# Patient Record
Sex: FEMALE | Race: WHITE | NOT HISPANIC OR LATINO | Employment: OTHER | URBAN - METROPOLITAN AREA
[De-identification: names, ages, dates, MRNs, and addresses within clinical notes are randomized per-mention and may not be internally consistent; named-entity substitution may affect disease eponyms.]

---

## 2017-01-25 ENCOUNTER — OFFICE VISIT (OUTPATIENT)
Dept: FAMILY MEDICINE CLINIC | Facility: CLINIC | Age: 27
End: 2017-01-25

## 2017-01-25 VITALS
DIASTOLIC BLOOD PRESSURE: 80 MMHG | BODY MASS INDEX: 27.48 KG/M2 | RESPIRATION RATE: 14 BRPM | SYSTOLIC BLOOD PRESSURE: 113 MMHG | HEART RATE: 64 BPM | HEIGHT: 60 IN | WEIGHT: 140 LBS | TEMPERATURE: 98.1 F

## 2017-01-25 DIAGNOSIS — M79.7 FIBROMYALGIA: ICD-10-CM

## 2017-01-25 DIAGNOSIS — R53.82 CHRONIC FATIGUE: Primary | ICD-10-CM

## 2017-01-25 DIAGNOSIS — R11.0 CHRONIC NAUSEA: ICD-10-CM

## 2017-01-25 PROCEDURE — 99214 OFFICE O/P EST MOD 30 MIN: CPT | Performed by: FAMILY MEDICINE

## 2017-01-25 RX ORDER — DULOXETIN HYDROCHLORIDE 60 MG/1
60 CAPSULE, DELAYED RELEASE ORAL DAILY
Qty: 30 CAPSULE | Refills: 5 | Status: SHIPPED | OUTPATIENT
Start: 2017-01-25 | End: 2017-05-08

## 2017-01-25 RX ORDER — DULOXETIN HYDROCHLORIDE 30 MG/1
30 CAPSULE, DELAYED RELEASE ORAL DAILY
Qty: 7 CAPSULE | Refills: 0 | Status: SHIPPED | OUTPATIENT
Start: 2017-01-25 | End: 2017-05-08

## 2017-01-25 NOTE — PROGRESS NOTES
"Subjective   Deneen Conti is a 26 y.o. female.     CC: Fatigue         Blood in Vomit    History of Present Illness     Pt comes in today after a trip to [place] 2 weeks ago and got sick several hours after eating some chicken. She had multiple emesis and noted some reddish blood in one of the emesis. She had her GB out in August 2016 and since has suffered from nausea a lot of the days and occational diarrhea.  She also has had some increasing fatigue over the weeks, too. She sees Dr. Roe, psychiatrist, for PTSD and some chronic depression/anxiety and she reports some chronic, upper/lower body aches, poor sleep, waxing/waning sx, and slow worsening over time.    The following portions of the patient's history were reviewed and updated as appropriate: allergies, current medications, past family history, past medical history, past social history, past surgical history and problem list.    Review of Systems   Constitutional: Positive for fatigue. Negative for activity change, chills and fever.   Respiratory: Negative for cough and chest tightness.    Cardiovascular: Negative for chest pain and palpitations.   Gastrointestinal: Positive for nausea. Negative for abdominal pain, anal bleeding and blood in stool.        Blood in vomit   Endocrine: Negative for cold intolerance.   Musculoskeletal: Positive for myalgias.   Psychiatric/Behavioral: Negative for behavioral problems and dysphoric mood.     Visit Vitals   • /80   • Pulse 64   • Temp 98.1 °F (36.7 °C) (Oral)   • Resp 14   • Ht 60\" (152.4 cm)   • Wt 140 lb (63.5 kg)   • BMI 27.34 kg/m2       Objective   Physical Exam   Constitutional: She appears well-developed and well-nourished.   Neck: Neck supple. No thyromegaly present.   Cardiovascular: Normal rate and regular rhythm.    No murmur heard.  Pulmonary/Chest: Effort normal and breath sounds normal.   Abdominal: Bowel sounds are normal. There is no tenderness.   Musculoskeletal:   Multiple trigger " points upper/lower noted   Psychiatric: She has a normal mood and affect. Her behavior is normal.   Nursing note and vitals reviewed.  Rosalva BAGLEY present for exam.    Assessment/Plan   Deneen was seen today for some blood in vomit and fatigue.    Diagnoses and all orders for this visit:    Chronic fatigue  -     TSH  -     T4, Free  -     Comprehensive Metabolic Panel  -     CBC & Differential    Fibromyalgia  -     DULoxetine (CYMBALTA) 30 MG capsule; Take 1 capsule by mouth Daily.  -     DULoxetine (CYMBALTA) 60 MG capsule; Take 1 capsule by mouth Daily.    Chronic nausea  -     Ambulatory Referral to Gastroenterology

## 2017-01-25 NOTE — MR AVS SNAPSHOT
Deneen Conti   1/25/2017 1:30 PM   Office Visit    Provider:  Yusuf Cameron MD   Department:  [facility]   Dept Phone:  [phone]                Your Full Care Plan              Today's Medication Changes          These changes are accurate as of: 1/25/17  2:16 PM.  If you have any questions, ask your nurse or doctor.               New Medication(s)Ordered:     * DULoxetine 30 MG capsule   Commonly known as:  CYMBALTA   Take 1 capsule by mouth Daily.   Started by:  Yusuf Cameron MD       * DULoxetine 60 MG capsule   Commonly known as:  CYMBALTA   Take 1 capsule by mouth Daily.   Started by:  Yusuf Cameron MD       * Notice:  This list has 2 medication(s) that are the same as other medications prescribed for you. Read the directions carefully, and ask your doctor or other care provider to review them with you.      Stop taking medication(s)listed here:     GILDESS FE 1/20 1-20 MG-MCG per tablet   Generic drug:  norethindrone-ethinyl estradiol   Stopped by:  Yusuf Cameron MD           MethylPREDNISolone 4 MG tablet   Commonly known as:  MEDROL   Stopped by:  Yusuf Cameron MD                Where to Get Your Medications      These medications were sent to [facility] [id number] [facility] - [address] - [phone]  - [phone] [address] [address]     Phone:  [phone]     DULoxetine 30 MG capsule    DULoxetine 60 MG capsule                  Your Updated Medication List          This list is accurate as of: 1/25/17  2:16 PM.  Always use your most recent med list.                * DULoxetine 30 MG capsule   Commonly known as:  CYMBALTA   Take 1 capsule by mouth Daily.       * DULoxetine 60 MG capsule   Commonly known as:  CYMBALTA   Take 1 capsule by mouth Daily.       * Notice:  This list has 2 medication(s) that are the same as other medications prescribed for you. Read the directions carefully, and ask your  "doctor or other care provider to review them with you.            We Performed the Following     Ambulatory Referral to Gastroenterology     CBC & Differential     Comprehensive Metabolic Panel     T4, Free     TSH       You Were Diagnosed With        Codes Comments    Chronic fatigue    -  Primary ICD-10-CM: R53.82  ICD-9-CM: 780.79     Fibromyalgia     ICD-10-CM: M79.7  ICD-9-CM: 729.1     Chronic nausea     ICD-10-CM: R11.0  ICD-9-CM: 787.02       Instructions     None    Patient Instructions History      [employer] Signup     [facility] [employer] allows you to send messages to your doctor, view your test results, renew your prescriptions, schedule appointments, and more. To sign up, go to [employer] and click on the Sign Up Now link in the New User? box. Enter your [employer] Activation Code exactly as it appears below along with the last four digits of your Social Security Number and your Date of Birth () to complete the sign-up process. If you do not sign up before the expiration date, you must request a new code.    [employer] Activation Code: [email or web address]  Expires: 2017  2:16 PM    If you have questions, you can email [email or web address]) or call [phone] to talk to our [employer] staff. Remember, [employer] is NOT to be used for urgent needs. For medical emergencies, dial 911.               Other Info from Your Visit           Allergies     Erythromycin      Gardasil [Hpv Vaccine Recombinant (Yeast Derived)]      Penicillins        Reason for Visit     some blood in vomit gallbladder removed 2016    Fatigue           Vital Signs     Blood Pressure Pulse Temperature Respirations Height Weight    113/80 64 98.1 °F (36.7 °C) (Oral) 14 60\" (152.4 cm) 140 lb (63.5 kg)    Body Mass Index Smoking Status                27.34 kg/m2 Never Smoker          Problems and Diagnoses Noted     Gall bladder inflammation    Fibromyalgia    Chronic fatigue    -  Primary    Chronic nausea          "

## 2017-01-26 LAB
ALBUMIN SERPL-MCNC: 4.4 G/DL (ref 3.5–5.2)
ALBUMIN/GLOB SERPL: 1.6 G/DL
ALP SERPL-CCNC: 97 U/L (ref 39–117)
ALT SERPL-CCNC: 16 U/L (ref 1–33)
AST SERPL-CCNC: 21 U/L (ref 1–32)
BASOPHILS # BLD AUTO: 0.03 10*3/MM3 (ref 0–0.2)
BASOPHILS NFR BLD AUTO: 0.3 % (ref 0–1.5)
BILIRUB SERPL-MCNC: 1.1 MG/DL (ref 0.1–1.2)
BUN SERPL-MCNC: 17 MG/DL (ref 6–20)
BUN/CREAT SERPL: 16 (ref 7–25)
CALCIUM SERPL-MCNC: 10.1 MG/DL (ref 8.6–10.5)
CHLORIDE SERPL-SCNC: 100 MMOL/L (ref 98–107)
CO2 SERPL-SCNC: 25.1 MMOL/L (ref 22–29)
CREAT SERPL-MCNC: 1.06 MG/DL (ref 0.57–1)
EOSINOPHIL # BLD AUTO: 0.11 10*3/MM3 (ref 0–0.7)
EOSINOPHIL NFR BLD AUTO: 1.1 % (ref 0.3–6.2)
ERYTHROCYTE [DISTWIDTH] IN BLOOD BY AUTOMATED COUNT: 12.7 % (ref 11.7–13)
GLOBULIN SER CALC-MCNC: 2.8 GM/DL
GLUCOSE SERPL-MCNC: 78 MG/DL (ref 65–99)
HCT VFR BLD AUTO: 44.3 % (ref 35.6–45.5)
HGB BLD-MCNC: 14.9 G/DL (ref 11.9–15.5)
IMM GRANULOCYTES # BLD: 0.02 10*3/MM3 (ref 0–0.03)
IMM GRANULOCYTES NFR BLD: 0.2 % (ref 0–0.5)
LYMPHOCYTES # BLD AUTO: 2.33 10*3/MM3 (ref 0.9–4.8)
LYMPHOCYTES NFR BLD AUTO: 23 % (ref 19.6–45.3)
MCH RBC QN AUTO: 29.4 PG (ref 26.9–32)
MCHC RBC AUTO-ENTMCNC: 33.6 G/DL (ref 32.4–36.3)
MCV RBC AUTO: 87.5 FL (ref 80.5–98.2)
MONOCYTES # BLD AUTO: 0.73 10*3/MM3 (ref 0.2–1.2)
MONOCYTES NFR BLD AUTO: 7.2 % (ref 5–12)
NEUTROPHILS # BLD AUTO: 6.89 10*3/MM3 (ref 1.9–8.1)
NEUTROPHILS NFR BLD AUTO: 68.2 % (ref 42.7–76)
PLATELET # BLD AUTO: 274 10*3/MM3 (ref 140–500)
POTASSIUM SERPL-SCNC: 4.4 MMOL/L (ref 3.5–5.2)
PROT SERPL-MCNC: 7.2 G/DL (ref 6–8.5)
RBC # BLD AUTO: 5.06 10*6/MM3 (ref 3.9–5.2)
SODIUM SERPL-SCNC: 139 MMOL/L (ref 136–145)
T4 FREE SERPL-MCNC: 1.36 NG/DL (ref 0.93–1.7)
TSH SERPL DL<=0.005 MIU/L-ACNC: 1.36 MIU/ML (ref 0.27–4.2)
WBC # BLD AUTO: 10.11 10*3/MM3 (ref 4.5–10.7)

## 2017-02-14 ENCOUNTER — OFFICE VISIT (OUTPATIENT)
Dept: GASTROENTEROLOGY | Facility: CLINIC | Age: 27
End: 2017-02-14

## 2017-02-14 VITALS
SYSTOLIC BLOOD PRESSURE: 128 MMHG | HEIGHT: 60 IN | BODY MASS INDEX: 27.68 KG/M2 | DIASTOLIC BLOOD PRESSURE: 76 MMHG | WEIGHT: 141 LBS

## 2017-02-14 DIAGNOSIS — R11.0 NAUSEA: Primary | ICD-10-CM

## 2017-02-14 PROCEDURE — 99203 OFFICE O/P NEW LOW 30 MIN: CPT | Performed by: INTERNAL MEDICINE

## 2017-02-14 RX ORDER — PANTOPRAZOLE SODIUM 40 MG/1
40 TABLET, DELAYED RELEASE ORAL DAILY
Qty: 30 TABLET | Refills: 1 | Status: SHIPPED | OUTPATIENT
Start: 2017-02-14 | End: 2017-05-08

## 2017-02-14 NOTE — PATIENT INSTRUCTIONS
Labs today    Start pantoprazole    Consider EGD in 1 month if no better-- pls call the office    For any additional questions, concerns or changes to your condition after today's office visit please contact the office at [phone].

## 2017-02-14 NOTE — PROGRESS NOTES
Chief Complaint   Patient presents with   • Nausea       Subjective     HPI    Deneen Conti is a 26 y.o. female with a past medical history noted below who presents for evaluation of nausea.  Symptoms present since high school-- thinks it was a reaction following her getting the HPV vaccine.   She had a month long spell of severe nausea and anorexia that gradually resolved.  Now with symptoms that are intermittent, anywhere from 3-5 times per week.  Depends on what she eats.  Worse with greasy, fried, and rich foods.  Ok with bland and low fat foods.  Symptoms will last all day.  Occasional vomiting of stomach contents.  Does not take any occasions for it.    She has been on a vegetarian diet.  Must be careful with her diet.  Weight has been stable for a couple of years.  BMs normal.  No heartburn or reflux.    Cholecystectomy 8/2016 due to low EF.  No change in symptoms of nausea since then.    No smoking, rare ETOH.  Works [employer].    No family history of GI malignancy.  Rare NSAIDs.      Past Medical History   Diagnosis Date   • Fatigue          Current Outpatient Prescriptions:   •  DULoxetine (CYMBALTA) 30 MG capsule, Take 1 capsule by mouth Daily., Disp: 7 capsule, Rfl: 0  •  DULoxetine (CYMBALTA) 60 MG capsule, Take 1 capsule by mouth Daily., Disp: 30 capsule, Rfl: 5  •  pantoprazole (PROTONIX) 40 MG EC tablet, Take 1 tablet by mouth Daily., Disp: 30 tablet, Rfl: 1    Allergies   Allergen Reactions   • Amoxicillin    • Erythromycin    • Gardasil [Hpv Vaccine Recombinant (Yeast Derived)]    • Penicillins        Social History     Social History   • Marital status:      Spouse name: N/A   • Number of children: N/A   • Years of education: N/A     Occupational History   • Not on file.     Social History Main Topics   • Smoking status: Never Smoker   • Smokeless tobacco: Not on file   • Alcohol use No   • Drug use: No   • Sexual activity: Not on file     Other Topics Concern   • Not on  file     Social History Narrative       Family History   Problem Relation Age of Onset   • Bone cancer Other    • Diabetes Other        Review of Systems   Constitutional: Negative for activity change, appetite change and fatigue.   HENT: Negative for congestion, sore throat and trouble swallowing.    Respiratory: Negative.    Cardiovascular: Negative.    Gastrointestinal: Positive for nausea. Negative for abdominal distention, abdominal pain and blood in stool.   Endocrine: Negative for cold intolerance and heat intolerance.   Genitourinary: Negative for difficulty urinating, dysuria and frequency.   Musculoskeletal: Negative for arthralgias, back pain and myalgias.   Skin: Negative.    Hematological: Negative for adenopathy. Does not bruise/bleed easily.   All other systems reviewed and are negative.      Objective     Vitals:    02/14/17 1430   BP: 128/76     Last 2 weights    02/14/17  1430   Weight: 141 lb (64 kg)     Body mass index is 27.54 kg/(m^2).    Physical Exam   Constitutional: She is oriented to person, place, and time. She appears well-developed and well-nourished. No distress.   HENT:   Head: Normocephalic and atraumatic.   Right Ear: External ear normal.   Left Ear: External ear normal.   Nose: Nose normal.   Mouth/Throat: Oropharynx is clear and moist.   Eyes: Conjunctivae and EOM are normal. Right eye exhibits no discharge. Left eye exhibits no discharge. No scleral icterus.   Neck: Normal range of motion. Neck supple. No thyromegaly present.   No supraclavicular adenopathy   Cardiovascular: Normal rate, regular rhythm, normal heart sounds and intact distal pulses.  Exam reveals no gallop.    No murmur heard.  No lower extremity edema   Pulmonary/Chest: Effort normal and breath sounds normal. No respiratory distress. She has no wheezes.   Abdominal: Soft. Normal appearance and bowel sounds are normal. She exhibits no distension and no mass. There is no hepatosplenomegaly. There is no tenderness.  There is no rigidity, no rebound and no guarding.   Genitourinary:   Genitourinary Comments: Rectal exam deferred   Musculoskeletal: Normal range of motion. She exhibits no edema or tenderness.   No atrophy of upper or lower extremities.  Normal digits and nails of both hands.   Lymphadenopathy:     She has no cervical adenopathy.   Neurological: She is alert and oriented to person, place, and time. She displays no atrophy. Coordination normal.   Skin: Skin is warm and dry. No rash noted. She is not diaphoretic. No erythema.   Psychiatric: She has a normal mood and affect. Her behavior is normal. Judgment and thought content normal.   Vitals reviewed.      WBC   Date Value Ref Range Status   01/25/2017 10.11 4.50 - 10.70 10*3/mm3 Final     RBC   Date Value Ref Range Status   01/25/2017 5.06 3.90 - 5.20 10*6/mm3 Final     HEMOGLOBIN   Date Value Ref Range Status   01/25/2017 14.9 11.9 - 15.5 g/dL Final     HEMATOCRIT   Date Value Ref Range Status   01/25/2017 44.3 35.6 - 45.5 % Final     MCV   Date Value Ref Range Status   01/25/2017 87.5 80.5 - 98.2 fL Final     MCH   Date Value Ref Range Status   01/25/2017 29.4 26.9 - 32.0 pg Final     MCHC   Date Value Ref Range Status   01/25/2017 33.6 32.4 - 36.3 g/dL Final     RDW   Date Value Ref Range Status   01/25/2017 12.7 11.7 - 13.0 % Final     PLATELETS   Date Value Ref Range Status   01/25/2017 274 140 - 500 10*3/mm3 Final     NEUTROPHIL REL %   Date Value Ref Range Status   01/25/2017 68.2 42.7 - 76.0 % Final     LYMPHOCYTE REL %   Date Value Ref Range Status   01/25/2017 23.0 19.6 - 45.3 % Final     MONOCYTE REL %   Date Value Ref Range Status   01/25/2017 7.2 5.0 - 12.0 % Final     EOSINOPHIL REL %   Date Value Ref Range Status   01/25/2017 1.1 0.3 - 6.2 % Final     BASOPHIL REL %   Date Value Ref Range Status   01/25/2017 0.3 0.0 - 1.5 % Final     NEUTROPHILS ABSOLUTE   Date Value Ref Range Status   01/25/2017 6.89 1.90 - 8.10 10*3/mm3 Final     LYMPHOCYTES  ABSOLUTE   Date Value Ref Range Status   01/25/2017 2.33 0.90 - 4.80 10*3/mm3 Final     MONOCYTES ABSOLUTE   Date Value Ref Range Status   01/25/2017 0.73 0.20 - 1.20 10*3/mm3 Final     EOSINOPHILS ABSOLUTE   Date Value Ref Range Status   01/25/2017 0.11 0.00 - 0.70 10*3/mm3 Final     BASOPHILS ABSOLUTE   Date Value Ref Range Status   01/25/2017 0.03 0.00 - 0.20 10*3/mm3 Final       SODIUM   Date Value Ref Range Status   01/25/2017 139 136 - 145 mmol/L Final     POTASSIUM   Date Value Ref Range Status   01/25/2017 4.4 3.5 - 5.2 mmol/L Final     TOTAL CO2   Date Value Ref Range Status   01/25/2017 25.1 22.0 - 29.0 mmol/L Final     CHLORIDE   Date Value Ref Range Status   01/25/2017 100 98 - 107 mmol/L Final     CREATININE   Date Value Ref Range Status   01/25/2017 1.06 (H) 0.57 - 1.00 mg/dL Final     BUN   Date Value Ref Range Status   01/25/2017 17 6 - 20 mg/dL Final     BUN/CREATININE RATIO   Date Value Ref Range Status   01/25/2017 16.0 7.0 - 25.0 Final     CALCIUM   Date Value Ref Range Status   01/25/2017 10.1 8.6 - 10.5 mg/dL Final     EGFR NON  AM   Date Value Ref Range Status   01/25/2017 63 >60 mL/min/1.73 Final     ALKALINE PHOSPHATASE   Date Value Ref Range Status   01/25/2017 97 39 - 117 U/L Final     ALT (SGPT)   Date Value Ref Range Status   01/25/2017 16 1 - 33 U/L Final     AST (SGOT)   Date Value Ref Range Status   01/25/2017 21 1 - 32 U/L Final     TOTAL BILIRUBIN   Date Value Ref Range Status   01/25/2017 1.1 0.1 - 1.2 mg/dL Final     ALBUMIN   Date Value Ref Range Status   01/25/2017 4.40 3.50 - 5.20 g/dL Final     A/G RATIO   Date Value Ref Range Status   01/25/2017 1.6 g/dL Final         Imaging Results (last 7 days)     ** No results found for the last 168 hours. **            No notes on file    Assessment/Plan    Nausea: Chronic problem for her.  Persistent despite having her gallbladder out.  Worse with fatty foods.  No alarm features such as weight loss, abnormal labs, fatigue  etc.  -Trial of daily pantoprazole  -Check H. pylori antibodies  -If no improvement with above then consider EGD and/or gastric emptying study    Deneen was seen today for nausea.    Diagnoses and all orders for this visit:    Nausea  -     pantoprazole (PROTONIX) 40 MG EC tablet; Take 1 tablet by mouth Daily.  -     H. Pylori Antibody, IgA  -     H. Pylori Antibody, IgG      I have discussed the above plan with the patient.  They verbalize understanding and are in agreement with the plan.  They have been advised to contact the office for any questions, concerns, or changes related to their health.    EMR Dragon/Transcription disclaimer:       Much of this encounter note is an electronic transcription/translation of spoken language to printed text. The electronic translation of spoken language may permit erroneous, or at times, nonsensical words or phrases to be inadvertently transcribed; Although I have reviewed the note for such errors, some may still exist.

## 2017-02-15 ENCOUNTER — TELEPHONE (OUTPATIENT)
Dept: FAMILY MEDICINE CLINIC | Facility: CLINIC | Age: 27
End: 2017-02-15

## 2017-02-17 LAB
H PYLORI IGA SER-ACNC: <9 UNITS (ref 0–8.9)
H PYLORI IGG SER IA-ACNC: <0.9 U/ML (ref 0–0.8)

## 2017-02-21 ENCOUNTER — TELEPHONE (OUTPATIENT)
Dept: GASTROENTEROLOGY | Facility: CLINIC | Age: 27
End: 2017-02-21

## 2017-02-21 NOTE — TELEPHONE ENCOUNTER
Call to pt.  Advise per Dr Rodgers that her H pylori testing was negative.  Pt verb understanding.

## 2017-02-21 NOTE — TELEPHONE ENCOUNTER
----- Message from Janice Rodgers MD sent at 2/20/2017  8:26 PM EST -----  Her H pylori testing was negative

## 2017-04-21 ENCOUNTER — OFFICE VISIT (OUTPATIENT)
Dept: GASTROENTEROLOGY | Facility: CLINIC | Age: 27
End: 2017-04-21

## 2017-04-21 VITALS
BODY MASS INDEX: 26.46 KG/M2 | DIASTOLIC BLOOD PRESSURE: 72 MMHG | HEIGHT: 60 IN | WEIGHT: 134.8 LBS | SYSTOLIC BLOOD PRESSURE: 122 MMHG

## 2017-04-21 DIAGNOSIS — R10.84 GENERALIZED ABDOMINAL PAIN: ICD-10-CM

## 2017-04-21 DIAGNOSIS — R11.0 NAUSEA: Primary | ICD-10-CM

## 2017-04-21 DIAGNOSIS — R63.4 WEIGHT LOSS: ICD-10-CM

## 2017-04-21 PROCEDURE — 99214 OFFICE O/P EST MOD 30 MIN: CPT | Performed by: INTERNAL MEDICINE

## 2017-04-21 RX ORDER — ONDANSETRON 4 MG/1
4 TABLET, ORALLY DISINTEGRATING ORAL EVERY 8 HOURS PRN
Qty: 15 TABLET | Refills: 0 | Status: SHIPPED | OUTPATIENT
Start: 2017-04-21 | End: 2017-06-26 | Stop reason: SDUPTHER

## 2017-04-21 RX ORDER — SODIUM CHLORIDE, SODIUM LACTATE, POTASSIUM CHLORIDE, CALCIUM CHLORIDE 600; 310; 30; 20 MG/100ML; MG/100ML; MG/100ML; MG/100ML
30 INJECTION, SOLUTION INTRAVENOUS CONTINUOUS
Status: CANCELLED | OUTPATIENT
Start: 2017-04-21

## 2017-04-21 RX ORDER — SODIUM CHLORIDE 0.9 % (FLUSH) 0.9 %
1-10 SYRINGE (ML) INJECTION AS NEEDED
Status: CANCELLED | OUTPATIENT
Start: 2017-04-21

## 2017-04-21 NOTE — PROGRESS NOTES
Chief Complaint   Patient presents with   • Nausea     Subjective     HPI  Deneen Conti is a 26 y.o. female who presents for follow up nausea.  Initially seen in February for symptoms.  It has been a chronic issue for her.  It precipitated her getting her gallbladder removed in August 2016.  This did not change her symptoms.  Persists with nausea, no vomiting, especially if she varies from her diet of nuts, fruit, vegetables, yogurt and water.  She does have some occasional diarrhea.  She reports may be 1-2 episodes every couple of weeks.  It is limited and not lasting all day.  Sometimes she will have some sharp abdominal pain related to this but not severe.  At her last visit we checked H. pylori antibodies which were negative.  Additionally she completed a trial of pantoprazole.  She reports no change in her symptoms.  She reports that today she is feeling nauseated and does not want to eat anything.  She has only had yogurt, fruits and granola today.  She has lost about 7 pounds since her last visit 2 months ago.  He feels that the weight loss is due to her restricted diet.  She has been able to eat certain foods that she is prepared in the evening, and in the next day she'll eat it and did not feel well.  Rare episodes of vomiting.    Past Medical History:   Diagnosis Date   • Fatigue        Social History     Social History   • Marital status:      Spouse name: N/A   • Number of children: N/A   • Years of education: N/A     Social History Main Topics   • Smoking status: Never Smoker   • Smokeless tobacco: None   • Alcohol use No   • Drug use: No   • Sexual activity: Not Asked     Other Topics Concern   • None     Social History Narrative         Current Outpatient Prescriptions:   •  DULoxetine (CYMBALTA) 30 MG capsule, Take 1 capsule by mouth Daily., Disp: 7 capsule, Rfl: 0  •  DULoxetine (CYMBALTA) 60 MG capsule, Take 1 capsule by mouth Daily., Disp: 30 capsule, Rfl: 5  •  ondansetron ODT (ZOFRAN  ODT) 4 MG disintegrating tablet, Take 1 tablet by mouth Every 8 (Eight) Hours As Needed for Nausea or Vomiting., Disp: 15 tablet, Rfl: 0  •  pantoprazole (PROTONIX) 40 MG EC tablet, Take 1 tablet by mouth Daily., Disp: 30 tablet, Rfl: 1    Review of Systems   Constitutional: Negative for activity change, appetite change, fatigue and unexpected weight change.   HENT: Negative for sore throat and trouble swallowing.    Respiratory: Negative.    Cardiovascular: Negative.    Gastrointestinal: Positive for nausea. Negative for abdominal distention, abdominal pain and blood in stool.   Endocrine: Negative for cold intolerance and heat intolerance.   Genitourinary: Negative for difficulty urinating, dysuria and frequency.   Musculoskeletal: Negative for arthralgias, back pain and myalgias.   Skin: Negative.    Hematological: Negative for adenopathy. Does not bruise/bleed easily.   All other systems reviewed and are negative.      Objective   Vitals:    04/21/17 1514   BP: 122/72     Last Weight    04/21/17  1514   Weight: 134 lb 12.8 oz (61.1 kg)     Body mass index is 26.33 kg/(m^2).      Physical Exam   Constitutional: She is oriented to person, place, and time. She appears well-developed and well-nourished. No distress.   HENT:   Head: Normocephalic and atraumatic.   Right Ear: External ear normal.   Left Ear: External ear normal.   Nose: Nose normal.   Eyes: Conjunctivae and EOM are normal. No scleral icterus.   Cardiovascular: Normal rate, regular rhythm, normal heart sounds and intact distal pulses.  Exam reveals no gallop.    No murmur heard.  No lower extremity edema   Pulmonary/Chest: Effort normal and breath sounds normal. No respiratory distress. She has no wheezes.   Abdominal: Soft. Normal appearance and bowel sounds are normal. She exhibits no distension and no mass. There is no hepatosplenomegaly. There is tenderness. There is no rigidity, no rebound and no guarding. No hernia.   Healed laparoscopic  cholecystectomy scars.  Mild tenderness to palpation in her bilateral lower quadrants   Genitourinary:   Genitourinary Comments: Rectal exam deferred   Musculoskeletal: Normal range of motion. She exhibits no edema or tenderness.   Normal digits and nails of both hands.  No atrophy or wasting of upper or lower extremeties   Neurological: She is alert and oriented to person, place, and time. She displays no atrophy. Coordination normal.   Skin: Skin is warm and dry. No rash noted. She is not diaphoretic. No erythema.   Psychiatric: She has a normal mood and affect. Her behavior is normal. Judgment and thought content normal.   Vitals reviewed.      WBC   Date Value Ref Range Status   01/25/2017 10.11 4.50 - 10.70 10*3/mm3 Final     RBC   Date Value Ref Range Status   01/25/2017 5.06 3.90 - 5.20 10*6/mm3 Final     Hemoglobin   Date Value Ref Range Status   01/25/2017 14.9 11.9 - 15.5 g/dL Final     Hematocrit   Date Value Ref Range Status   01/25/2017 44.3 35.6 - 45.5 % Final     MCV   Date Value Ref Range Status   01/25/2017 87.5 80.5 - 98.2 fL Final     MCH   Date Value Ref Range Status   01/25/2017 29.4 26.9 - 32.0 pg Final     MCHC   Date Value Ref Range Status   01/25/2017 33.6 32.4 - 36.3 g/dL Final     RDW   Date Value Ref Range Status   01/25/2017 12.7 11.7 - 13.0 % Final     Platelets   Date Value Ref Range Status   01/25/2017 274 140 - 500 10*3/mm3 Final     Neutrophil Rel %   Date Value Ref Range Status   01/25/2017 68.2 42.7 - 76.0 % Final     Lymphocyte Rel %   Date Value Ref Range Status   01/25/2017 23.0 19.6 - 45.3 % Final     Monocyte Rel %   Date Value Ref Range Status   01/25/2017 7.2 5.0 - 12.0 % Final     Eosinophil Rel %   Date Value Ref Range Status   01/25/2017 1.1 0.3 - 6.2 % Final     Basophil Rel %   Date Value Ref Range Status   01/25/2017 0.3 0.0 - 1.5 % Final     Neutrophils Absolute   Date Value Ref Range Status   01/25/2017 6.89 1.90 - 8.10 10*3/mm3 Final     Lymphocytes Absolute   Date  Value Ref Range Status   01/25/2017 2.33 0.90 - 4.80 10*3/mm3 Final     Monocytes Absolute   Date Value Ref Range Status   01/25/2017 0.73 0.20 - 1.20 10*3/mm3 Final     Eosinophils Absolute   Date Value Ref Range Status   01/25/2017 0.11 0.00 - 0.70 10*3/mm3 Final     Basophils Absolute   Date Value Ref Range Status   01/25/2017 0.03 0.00 - 0.20 10*3/mm3 Final       Lab Results   Component Value Date    BUN 17 01/25/2017    CREATININE 1.06 (H) 01/25/2017    EGFRIFNONA 63 01/25/2017    EGFRIFAFRI 77 01/25/2017    BCR 16.0 01/25/2017    CO2 25.1 01/25/2017    CALCIUM 10.1 01/25/2017    PROTENTOTREF 7.2 01/25/2017    ALBUMIN 4.40 01/25/2017    LABIL2 1.6 01/25/2017    AST 21 01/25/2017    ALT 16 01/25/2017         Imaging Results (last 7 days)     ** No results found for the last 168 hours. **            Assessment/Plan    1.  Nausea: Worsening.  Chronic and persistent problem.  It is causing severe food editing and weight loss.  Negative H. pylori antibodies.  No response to PPI.  Differential of gastritis versus gastroparesis versus functional dyspepsia  2.  Weight loss: New issue, secondary to symptoms of above, weight had been stable at her prior visit  3.  Abdominal pain: Stable.  Generalized location, tender to palpation mildly in her bilateral lower quadrants    Plan:  I think at this point, EGD is merited for further evaluation to rule out any gastritis or obstruction.  Discussed the procedure, the risks and she verbalizes understanding and agrees to proceed  If the above is normal, then well proceed with gastric emptying study  She is to use ginger candy and ginger ale as needed to try to manage mild symptoms of nausea  Zofran only for severe episodes of nausea    Deneen was seen today for nausea.    Diagnoses and all orders for this visit:    Nausea  -     Case Request; Standing  -     Implement Anesthesia Orders Day of Procedure; Standing  -     Obtain Informed Consent; Standing  -     Verify Informed  Consent; Standing  -     Insert Peripheral IV; Standing  -     Saline Lock & Maintain IV Access; Standing  -     sodium chloride 0.9 % flush 1-10 mL; Infuse 1-10 mL into a venous catheter As Needed for Line Care.  -     lactated ringers infusion; Infuse 30 mL/hr into a venous catheter Continuous.  -     Case Request  -     ondansetron ODT (ZOFRAN ODT) 4 MG disintegrating tablet; Take 1 tablet by mouth Every 8 (Eight) Hours As Needed for Nausea or Vomiting.    Weight loss  -     Case Request; Standing  -     Implement Anesthesia Orders Day of Procedure; Standing  -     Obtain Informed Consent; Standing  -     Verify Informed Consent; Standing  -     Insert Peripheral IV; Standing  -     Saline Lock & Maintain IV Access; Standing  -     sodium chloride 0.9 % flush 1-10 mL; Infuse 1-10 mL into a venous catheter As Needed for Line Care.  -     lactated ringers infusion; Infuse 30 mL/hr into a venous catheter Continuous.  -     Case Request    Generalized abdominal pain  -     Case Request; Standing  -     Implement Anesthesia Orders Day of Procedure; Standing  -     Obtain Informed Consent; Standing  -     Verify Informed Consent; Standing  -     Insert Peripheral IV; Standing  -     Saline Lock & Maintain IV Access; Standing  -     sodium chloride 0.9 % flush 1-10 mL; Infuse 1-10 mL into a venous catheter As Needed for Line Care.  -     lactated ringers infusion; Infuse 30 mL/hr into a venous catheter Continuous.  -     Case Request        EMR Dragon/Transcription disclaimer:       Much of this encounter note is an electronic transcription/translation of spoken language to printed text. The electronic translation of spoken language may permit erroneous, or at times, nonsensical words or phrases to be inadvertently transcribed; Although I have reviewed the note for such errors, some may still exist.

## 2017-04-21 NOTE — PATIENT INSTRUCTIONS
Schedule the EGD     Use ginger ale, ginger candy to help with nausea    Use the zofran for severe episodes of nausea    For any additional questions, concerns or changes to your condition after today's office visit please contact the office at [phone].

## 2017-05-09 ENCOUNTER — HOSPITAL ENCOUNTER (OUTPATIENT)
Facility: HOSPITAL | Age: 27
Setting detail: HOSPITAL OUTPATIENT SURGERY
Discharge: HOME | End: 2017-05-09
Attending: INTERNAL MEDICINE | Admitting: INTERNAL MEDICINE

## 2017-05-09 ENCOUNTER — ANESTHESIA EVENT (OUTPATIENT)
Dept: GASTROENTEROLOGY | Facility: HOSPITAL | Age: 27
End: 2017-05-09

## 2017-05-09 ENCOUNTER — ANESTHESIA (OUTPATIENT)
Dept: GASTROENTEROLOGY | Facility: HOSPITAL | Age: 27
End: 2017-05-09

## 2017-05-09 VITALS
DIASTOLIC BLOOD PRESSURE: 74 MMHG | WEIGHT: 131.8 LBS | RESPIRATION RATE: 14 BRPM | HEART RATE: 67 BPM | SYSTOLIC BLOOD PRESSURE: 110 MMHG | HEIGHT: 60 IN | BODY MASS INDEX: 25.87 KG/M2 | TEMPERATURE: 98 F | OXYGEN SATURATION: 100 %

## 2017-05-09 DIAGNOSIS — R11.0 NAUSEA: ICD-10-CM

## 2017-05-09 DIAGNOSIS — R10.84 GENERALIZED ABDOMINAL PAIN: ICD-10-CM

## 2017-05-09 DIAGNOSIS — R63.4 WEIGHT LOSS: ICD-10-CM

## 2017-05-09 LAB
B-HCG UR QL: NEGATIVE
INTERNAL NEGATIVE CONTROL: NEGATIVE
INTERNAL POSITIVE CONTROL: POSITIVE
Lab: NORMAL

## 2017-05-09 PROCEDURE — 43239 EGD BIOPSY SINGLE/MULTIPLE: CPT | Performed by: INTERNAL MEDICINE

## 2017-05-09 PROCEDURE — 25010000002 PROPOFOL 10 MG/ML EMULSION: Performed by: NURSE ANESTHETIST, CERTIFIED REGISTERED

## 2017-05-09 PROCEDURE — 88305 TISSUE EXAM BY PATHOLOGIST: CPT | Performed by: INTERNAL MEDICINE

## 2017-05-09 PROCEDURE — 88312 SPECIAL STAINS GROUP 1: CPT | Performed by: INTERNAL MEDICINE

## 2017-05-09 RX ORDER — PROPOFOL 10 MG/ML
VIAL (ML) INTRAVENOUS AS NEEDED
Status: DISCONTINUED | OUTPATIENT
Start: 2017-05-09 | End: 2017-05-09 | Stop reason: SURG

## 2017-05-09 RX ORDER — PROPOFOL 10 MG/ML
VIAL (ML) INTRAVENOUS CONTINUOUS PRN
Status: DISCONTINUED | OUTPATIENT
Start: 2017-05-09 | End: 2017-05-09 | Stop reason: SURG

## 2017-05-09 RX ORDER — LIDOCAINE HYDROCHLORIDE 20 MG/ML
INJECTION, SOLUTION INFILTRATION; PERINEURAL AS NEEDED
Status: DISCONTINUED | OUTPATIENT
Start: 2017-05-09 | End: 2017-05-09 | Stop reason: SURG

## 2017-05-09 RX ORDER — SODIUM CHLORIDE 0.9 % (FLUSH) 0.9 %
1-10 SYRINGE (ML) INJECTION AS NEEDED
Status: DISCONTINUED | OUTPATIENT
Start: 2017-05-09 | End: 2017-05-09 | Stop reason: HOSPADM

## 2017-05-09 RX ORDER — SODIUM CHLORIDE, SODIUM LACTATE, POTASSIUM CHLORIDE, CALCIUM CHLORIDE 600; 310; 30; 20 MG/100ML; MG/100ML; MG/100ML; MG/100ML
30 INJECTION, SOLUTION INTRAVENOUS CONTINUOUS
Status: DISCONTINUED | OUTPATIENT
Start: 2017-05-09 | End: 2017-05-09 | Stop reason: HOSPADM

## 2017-05-09 RX ADMIN — SODIUM CHLORIDE, POTASSIUM CHLORIDE, SODIUM LACTATE AND CALCIUM CHLORIDE: 600; 310; 30; 20 INJECTION, SOLUTION INTRAVENOUS at 13:55

## 2017-05-09 RX ADMIN — PROPOFOL 100 MCG/KG/MIN: 10 INJECTION, EMULSION INTRAVENOUS at 13:57

## 2017-05-09 RX ADMIN — PROPOFOL 50 MG: 10 INJECTION, EMULSION INTRAVENOUS at 13:59

## 2017-05-09 RX ADMIN — LIDOCAINE HYDROCHLORIDE 60 MG: 20 INJECTION, SOLUTION INFILTRATION; PERINEURAL at 13:57

## 2017-05-09 RX ADMIN — PROPOFOL 50 MG: 10 INJECTION, EMULSION INTRAVENOUS at 14:01

## 2017-05-09 RX ADMIN — PROPOFOL 50 MG: 10 INJECTION, EMULSION INTRAVENOUS at 13:57

## 2017-05-09 RX ADMIN — SODIUM CHLORIDE, POTASSIUM CHLORIDE, SODIUM LACTATE AND CALCIUM CHLORIDE 30 ML/HR: 600; 310; 30; 20 INJECTION, SOLUTION INTRAVENOUS at 13:47

## 2017-05-10 ENCOUNTER — TELEPHONE (OUTPATIENT)
Dept: GASTROENTEROLOGY | Facility: CLINIC | Age: 27
End: 2017-05-10

## 2017-05-10 DIAGNOSIS — R11.0 NAUSEA: Primary | ICD-10-CM

## 2017-05-10 LAB
CYTO UR: NORMAL
LAB AP CASE REPORT: NORMAL
Lab: NORMAL
PATH REPORT.FINAL DX SPEC: NORMAL
PATH REPORT.GROSS SPEC: NORMAL

## 2017-05-18 ENCOUNTER — APPOINTMENT (OUTPATIENT)
Dept: NUCLEAR MEDICINE | Facility: HOSPITAL | Age: 27
End: 2017-05-18
Attending: INTERNAL MEDICINE

## 2017-06-23 DIAGNOSIS — R11.0 NAUSEA: ICD-10-CM

## 2017-06-23 RX ORDER — PANTOPRAZOLE SODIUM 40 MG/1
TABLET, DELAYED RELEASE ORAL
Qty: 30 TABLET | Refills: 1 | Status: SHIPPED | OUTPATIENT
Start: 2017-06-23 | End: 2017-10-02 | Stop reason: SDUPTHER

## 2017-06-26 DIAGNOSIS — R11.0 NAUSEA: ICD-10-CM

## 2017-06-28 RX ORDER — ONDANSETRON 4 MG/1
TABLET, ORALLY DISINTEGRATING ORAL
Qty: 15 TABLET | Refills: 0 | Status: SHIPPED | OUTPATIENT
Start: 2017-06-28 | End: 2021-01-07

## 2017-06-29 ENCOUNTER — DOCUMENTATION (OUTPATIENT)
Dept: GASTROENTEROLOGY | Facility: CLINIC | Age: 27
End: 2017-06-29

## 2017-06-29 NOTE — PROGRESS NOTES
Rx for Pantoprazole 40 mg # 1 PO QAM # [id number]  No refill given  Insurance will not cover less than 90 days with this plan per pharmacy  Patient has appt to be seen in July

## 2017-07-21 ENCOUNTER — OFFICE VISIT (OUTPATIENT)
Dept: GASTROENTEROLOGY | Facility: CLINIC | Age: 27
End: 2017-07-21

## 2017-07-21 VITALS
SYSTOLIC BLOOD PRESSURE: 112 MMHG | TEMPERATURE: 98.2 F | DIASTOLIC BLOOD PRESSURE: 72 MMHG | BODY MASS INDEX: 26.03 KG/M2 | HEIGHT: 60 IN | WEIGHT: 132.6 LBS

## 2017-07-21 DIAGNOSIS — R63.4 WEIGHT LOSS: ICD-10-CM

## 2017-07-21 DIAGNOSIS — R10.13 DYSPEPSIA: ICD-10-CM

## 2017-07-21 DIAGNOSIS — R11.0 NAUSEA: Primary | ICD-10-CM

## 2017-07-21 PROCEDURE — 99213 OFFICE O/P EST LOW 20 MIN: CPT | Performed by: INTERNAL MEDICINE

## 2017-07-21 NOTE — PATIENT INSTRUCTIONS
Continue the pantoprazole for the next month.  After that, wean it to every other day for a month.  OK to take over the counter zantac as needed for any breakthrough symptoms.  After weaning for a month, ok to stop the pantoprazole and use the zantac as needed    Continue to avoid the problematic foods    For any additional questions, concerns or changes to your condition after today's office visit please contact the office at [phone].

## 2017-07-21 NOTE — PROGRESS NOTES
"Chief Complaint   Patient presents with   • Nausea     Subjective     HPI  Deneen Conti is a 27 y.o. female who presents for follow up of nausea, weight loss and abdominal pain.  Underwent EGD notable for mild small bowel inflammation and no H pylori.  I ordered a gastric emptying study but she has not had this yet.  Since her scope test on May 9, she reports that her symptoms have improved.  She has been on daily pantoprazole and needed Zofran.  She has monitored her diet and is avoiding spicy and acidic foods as well as fatty and heavy foods.  She is needed Zofran maybe only once per month.  Her weight is stable.  She denies any vomiting, abdominal pain, or change in her bowel habits.  She is otherwise eating and drinking normally, she does not find her diet too restrictive for her.      Past Medical History:   Diagnosis Date   • Fatigue    • Nausea & vomiting    • PONV (postoperative nausea and vomiting)    • Stomach pain     \"STABBING PAIN\"       Social History     Social History   • Marital status:      Spouse name: N/A   • Number of children: N/A   • Years of education: N/A     Social History Main Topics   • Smoking status: Never Smoker   • Smokeless tobacco: None   • Alcohol use Yes      Comment: rare   • Drug use: No   • Sexual activity: Not Asked     Other Topics Concern   • None     Social History Narrative         Current Outpatient Prescriptions:   •  ondansetron ODT (ZOFRAN-ODT) 4 MG disintegrating tablet, DISSOLVE 1 TABLET BY MOUTH EVERY 8 (EIGHT) HOURS AS NEEDED FOR NAUSEA OR VOMITING., Disp: 15 tablet, Rfl: 0  •  pantoprazole (PROTONIX) 40 MG EC tablet, TAKE 1 TABLET BY MOUTH ONCE DAILY, Disp: 30 tablet, Rfl: 1    Review of Systems   Constitutional: Negative for activity change, appetite change and fatigue.   HENT: Negative for sore throat and trouble swallowing.    Gastrointestinal: Negative for abdominal distention, abdominal pain and blood in stool.   Endocrine: Negative for cold " intolerance and heat intolerance.   Genitourinary: Negative for difficulty urinating, dysuria and frequency.   Musculoskeletal: Negative for arthralgias, back pain and myalgias.   Hematological: Negative for adenopathy. Does not bruise/bleed easily.   All other systems reviewed and are negative.      Objective   Vitals:    07/21/17 1444   BP: 112/72   Temp: 98.2 °F (36.8 °C)     Last Weight    07/21/17  1444   Weight: 132 lb 9.6 oz (60.1 kg)     Body mass index is 25.9 kg/(m^2).      Physical Exam   Constitutional: She is oriented to person, place, and time. She appears well-developed and well-nourished. No distress.   HENT:   Head: Normocephalic and atraumatic.   Right Ear: External ear normal.   Left Ear: External ear normal.   Nose: Nose normal.   Mouth/Throat: Oropharynx is clear and moist.   Eyes: Conjunctivae and EOM are normal. Right eye exhibits no discharge. Left eye exhibits no discharge. No scleral icterus.   Neck: Normal range of motion. Neck supple. No thyromegaly present.   No supraclavicular adenopathy   Cardiovascular: Normal rate, regular rhythm, normal heart sounds and intact distal pulses.  Exam reveals no gallop.    No murmur heard.  No lower extremity edema   Pulmonary/Chest: Effort normal and breath sounds normal. No respiratory distress. She has no wheezes.   Abdominal: Soft. Normal appearance and bowel sounds are normal. She exhibits no distension and no mass. There is no hepatosplenomegaly. There is no tenderness. There is no rigidity, no rebound and no guarding.   Genitourinary:   Genitourinary Comments: Rectal exam deferred   Musculoskeletal: Normal range of motion. She exhibits no edema or tenderness.   No atrophy of upper or lower extremities.  Normal digits and nails of both hands.   Lymphadenopathy:     She has no cervical adenopathy.   Neurological: She is alert and oriented to person, place, and time. She displays no atrophy. Coordination normal.   Skin: Skin is warm and dry. No rash  noted. She is not diaphoretic. No erythema.   Psychiatric: She has a normal mood and affect. Her behavior is normal. Judgment and thought content normal.   Vitals reviewed.      WBC   Date Value Ref Range Status   01/25/2017 10.11 4.50 - 10.70 10*3/mm3 Final     RBC   Date Value Ref Range Status   01/25/2017 5.06 3.90 - 5.20 10*6/mm3 Final     Hemoglobin   Date Value Ref Range Status   01/25/2017 14.9 11.9 - 15.5 g/dL Final     Hematocrit   Date Value Ref Range Status   01/25/2017 44.3 35.6 - 45.5 % Final     MCV   Date Value Ref Range Status   01/25/2017 87.5 80.5 - 98.2 fL Final     MCH   Date Value Ref Range Status   01/25/2017 29.4 26.9 - 32.0 pg Final     MCHC   Date Value Ref Range Status   01/25/2017 33.6 32.4 - 36.3 g/dL Final     RDW   Date Value Ref Range Status   01/25/2017 12.7 11.7 - 13.0 % Final     Platelets   Date Value Ref Range Status   01/25/2017 274 140 - 500 10*3/mm3 Final     Neutrophil Rel %   Date Value Ref Range Status   01/25/2017 68.2 42.7 - 76.0 % Final     Lymphocyte Rel %   Date Value Ref Range Status   01/25/2017 23.0 19.6 - 45.3 % Final     Monocyte Rel %   Date Value Ref Range Status   01/25/2017 7.2 5.0 - 12.0 % Final     Eosinophil Rel %   Date Value Ref Range Status   01/25/2017 1.1 0.3 - 6.2 % Final     Basophil Rel %   Date Value Ref Range Status   01/25/2017 0.3 0.0 - 1.5 % Final     Neutrophils Absolute   Date Value Ref Range Status   01/25/2017 6.89 1.90 - 8.10 10*3/mm3 Final     Lymphocytes Absolute   Date Value Ref Range Status   01/25/2017 2.33 0.90 - 4.80 10*3/mm3 Final     Monocytes Absolute   Date Value Ref Range Status   01/25/2017 0.73 0.20 - 1.20 10*3/mm3 Final     Eosinophils Absolute   Date Value Ref Range Status   01/25/2017 0.11 0.00 - 0.70 10*3/mm3 Final     Basophils Absolute   Date Value Ref Range Status   01/25/2017 0.03 0.00 - 0.20 10*3/mm3 Final       Lab Results   Component Value Date    BUN 17 01/25/2017    CREATININE 1.06 (H) 01/25/2017    EGFRIFNONA 63  01/25/2017    EGFRIFAFRI 77 01/25/2017    BCR 16.0 01/25/2017    CO2 25.1 01/25/2017    CALCIUM 10.1 01/25/2017    PROTENTOTREF 7.2 01/25/2017    ALBUMIN 4.40 01/25/2017    LABIL2 1.6 01/25/2017    AST 21 01/25/2017    ALT 16 01/25/2017         Imaging Results (last 7 days)     ** No results found for the last 168 hours. **            Assessment/Plan    1. Nausea: Significantly improved, she is only needed a rare Zofran  2.  Dyspepsia: Mild inflammation of her gastric biopsies; better on pantoprazole  3. Weight loss: He had dropped over 10 pounds due to her initial presenting symptoms; she remains stable following that    Plan  -I Will have her continue the pantoprazole for the next month and then she will wean it off over the second month.  She is to get over-the-counter Zantac and take that as needed for any breakthrough symptoms  -She is to continue her good dietary habits  -She is to let me know should symptoms return  -Otherwise we'll have her plan to come back in October to follow-up unless she doing totally fine  Deneen was seen today for nausea.    Diagnoses and all orders for this visit:    Nausea    Weight loss    Dyspepsia        Dictated utilizing Dragon dictation

## 2017-10-02 DIAGNOSIS — R11.0 NAUSEA: ICD-10-CM

## 2017-10-02 RX ORDER — PANTOPRAZOLE SODIUM 40 MG/1
TABLET, DELAYED RELEASE ORAL
Qty: 30 TABLET | Refills: 0 | Status: SHIPPED | OUTPATIENT
Start: 2017-10-02 | End: 2017-10-02 | Stop reason: SDUPTHER

## 2017-10-02 NOTE — TELEPHONE ENCOUNTER
Message sent to Dr Rodgers regarding refilling of pantoprazole.  Per last office note pt was to wean off of.

## 2017-10-04 RX ORDER — PANTOPRAZOLE SODIUM 40 MG/1
TABLET, DELAYED RELEASE ORAL
Qty: 90 TABLET | Refills: 0 | Status: SHIPPED | OUTPATIENT
Start: 2017-10-04 | End: 2021-01-07

## 2017-10-04 NOTE — TELEPHONE ENCOUNTER
Edin message received that insurance will only allow a 90 day supply, not a 30 day supply of Pantoprazole 40 mg 1 tab po daily.    See other note of 10/2 - message to DR Rodgers.

## 2017-10-25 NOTE — TELEPHONE ENCOUNTER
Called pt and advised per dr Rodgers that she needs to wean off with as needed zantac for any further symptoms.  Pt verb understanding and changed her appt to 10/18/2022 due to her work schedule.

## 2018-07-23 ENCOUNTER — OFFICE VISIT (OUTPATIENT)
Dept: FAMILY MEDICINE CLINIC | Facility: CLINIC | Age: 28
End: 2018-07-23

## 2018-07-23 VITALS
SYSTOLIC BLOOD PRESSURE: 118 MMHG | RESPIRATION RATE: 16 BRPM | HEART RATE: 70 BPM | TEMPERATURE: 97.8 F | HEIGHT: 60 IN | DIASTOLIC BLOOD PRESSURE: 80 MMHG | BODY MASS INDEX: 26.31 KG/M2 | WEIGHT: 134 LBS

## 2018-07-23 DIAGNOSIS — M79.7 FIBROMYALGIA: ICD-10-CM

## 2018-07-23 DIAGNOSIS — F33.1 MODERATE EPISODE OF RECURRENT MAJOR DEPRESSIVE DISORDER (HCC): Primary | ICD-10-CM

## 2018-07-23 PROCEDURE — 99213 OFFICE O/P EST LOW 20 MIN: CPT | Performed by: FAMILY MEDICINE

## 2018-07-23 RX ORDER — ESCITALOPRAM OXALATE 10 MG/1
10 TABLET ORAL DAILY
Qty: 30 TABLET | Refills: 2 | Status: SHIPPED | OUTPATIENT
Start: 2018-07-23 | End: 2018-08-27 | Stop reason: SDUPTHER

## 2018-07-23 RX ORDER — NORETHINDRONE ACETATE AND ETHINYL ESTRADIOL AND FERROUS FUMARATE 1MG-20(21)
1 KIT ORAL DAILY
Refills: 3 | COMMUNITY
Start: 2018-05-29

## 2018-07-23 NOTE — PROGRESS NOTES
"Subjective   Deneen Conti is a 28 y.o. female.     CC: Depression/Anxiety/Fibromyalgia    History of Present Illness     Pt returns today c/o global fibromyalgia pain along with depression and anxiety. Hasn't seen her therapist since the Fall and was given Cymbalta last time and didn't like how it made her feel. No SI/HI.   Pt reports she is not pregnant.    The following portions of the patient's history were reviewed and updated as appropriate: allergies, current medications, past family history, past medical history, past social history, past surgical history and problem list.    Review of Systems   Constitutional: Negative for activity change, chills, fatigue and fever.   Respiratory: Negative for cough and chest tightness.    Cardiovascular: Negative for chest pain and palpitations.   Gastrointestinal: Negative for abdominal pain and nausea.   Endocrine: Negative for cold intolerance.   Musculoskeletal: Positive for myalgias.   Psychiatric/Behavioral: Positive for dysphoric mood. Negative for behavioral problems and suicidal ideas. The patient is nervous/anxious.      /80   Pulse 70   Temp 97.8 °F (36.6 °C) (Oral)   Resp 16   Ht 152.4 cm (60\")   Wt 60.8 kg (134 lb)   BMI 26.17 kg/m²     Objective   Physical Exam   Constitutional: She appears well-developed and well-nourished.   Neck: Neck supple. No thyromegaly present.   Cardiovascular: Normal rate and regular rhythm.    No murmur heard.  Pulmonary/Chest: Effort normal and breath sounds normal.   Abdominal: Bowel sounds are normal. There is no tenderness.   Musculoskeletal: She exhibits tenderness (multiple point tenderness upper/lower body).   Neurological: She is alert.   Psychiatric: She has a normal mood and affect. Her behavior is normal.   Nursing note and vitals reviewed.      Assessment/Plan   Deneen was seen today for anxiety, depression and fibromyalgia.    Diagnoses and all orders for this visit:    Moderate episode of recurrent major " depressive disorder (CMS/HCC)  -     escitalopram (LEXAPRO) 10 MG tablet; Take 1 tablet by mouth Daily.    Fibromyalgia      Strongly encouraged pt to get back over to see her therapist.

## 2018-08-27 ENCOUNTER — OFFICE VISIT (OUTPATIENT)
Dept: FAMILY MEDICINE CLINIC | Facility: CLINIC | Age: 28
End: 2018-08-27

## 2018-08-27 VITALS
BODY MASS INDEX: 25.32 KG/M2 | HEIGHT: 60 IN | SYSTOLIC BLOOD PRESSURE: 120 MMHG | TEMPERATURE: 98.5 F | DIASTOLIC BLOOD PRESSURE: 83 MMHG | WEIGHT: 129 LBS | RESPIRATION RATE: 16 BRPM | HEART RATE: 86 BPM

## 2018-08-27 DIAGNOSIS — F33.1 MODERATE EPISODE OF RECURRENT MAJOR DEPRESSIVE DISORDER (HCC): ICD-10-CM

## 2018-08-27 PROCEDURE — 99213 OFFICE O/P EST LOW 20 MIN: CPT | Performed by: FAMILY MEDICINE

## 2018-08-27 RX ORDER — ESCITALOPRAM OXALATE 10 MG/1
10 TABLET ORAL DAILY
Qty: 30 TABLET | Refills: 2 | Status: CANCELLED | OUTPATIENT
Start: 2018-08-27

## 2018-08-27 RX ORDER — ESCITALOPRAM OXALATE 20 MG/1
20 TABLET ORAL DAILY
Qty: 30 TABLET | Refills: 11 | Status: SHIPPED | OUTPATIENT
Start: 2018-08-27 | End: 2019-09-11 | Stop reason: SDUPTHER

## 2018-08-27 NOTE — PROGRESS NOTES
"Subjective   Deneen Conti is a 28 y.o. female.     History of Present Illness     Chief Complaint:   Chief Complaint   Patient presents with   • Depression       Deneen Conti 28 y.o. female who presents today for Medical Management of the below listed issues and medication refills.  she has a problem list of   Patient Active Problem List   Diagnosis   • Chronic cholecystitis   • Fibromyalgia   • Dyspepsia   • Nausea   • Weight loss   • Moderate episode of recurrent major depressive disorder (CMS/HCC)   .  Since the last visit, she has overall felt well and likes the medication, although reports still with some residual anxiety due to situational issues.  she has been compliant with   Current Outpatient Prescriptions:   •  escitalopram (LEXAPRO) 20 MG tablet, Take 1 tablet by mouth Daily., Disp: 30 tablet, Rfl: 11  •  JUNEL FE 1/20 1-20 MG-MCG per tablet, Take 1 tablet by mouth Daily., Disp: , Rfl: 3  •  ondansetron ODT (ZOFRAN-ODT) 4 MG disintegrating tablet, DISSOLVE 1 TABLET BY MOUTH EVERY 8 (EIGHT) HOURS AS NEEDED FOR NAUSEA OR VOMITING., Disp: 15 tablet, Rfl: 0  •  pantoprazole (PROTONIX) 40 MG EC tablet, TAKE ONE TABLET BY MOUTH DAILY, Disp: 90 tablet, Rfl: 0.  she denies medication side effects.    All of the chronic condition(s) listed above are stable w/o issues.    /83   Pulse 86   Temp 98.5 °F (36.9 °C) (Oral)   Resp 16   Ht 152.4 cm (60\")   Wt 58.5 kg (129 lb)   BMI 25.19 kg/m²     Results for orders placed or performed during the hospital encounter of 05/09/17   POC Pregnancy, Urine   Result Value Ref Range    HCG, Urine, QL Negative Negative    Lot Number [id number]     Internal Positive Control Positive     Internal Negative Control Negative    Tissue Exam   Result Value Ref Range    Case Report       Surgical Pathology Report                         Case: [id number]                                  Authorizing Provider:  Janice Rodgers MD         Collected:           05/09/2017 02:02 " "PM          Ordering Location:     [facility]  Received:            05/09/2017 03:15 PM                                 ENDO SUITES                                                                  Pathologist:           Rodolfo Perkins MD                                                                           Specimens:   1) - Small Intestine, Duodenum                                                                      2) - Gastric, Antrum, ANTRUM AND BODY BIOPSY                                               Final Diagnosis       1. SMALL INTESTINE/DUODENUM:   SMALL INTESTINAL MUCOSA WITH FOCAL MILD VILLOUS BLUNTING AND INCREASED CHRONIC    INFLAMMATORY CELLS IN THE LAMINA PROPRIA, NON-SPECIFIC.    COMMENT: I do not detect any definite increase in the number of intraepithelial mononuclear cells.    2. GASTRIC ANTRUM, BODY:   GASTRIC MUCOSA WITH MILD REACTIVE EPITHELIAL CHANGES, NON-SPECIFIC.   NO HELICOBACTER ORGANISMS IDENTIFIED ON DIFF-QUIK STAIN.    CMK/pkm    CPT CODES:  1. 47567  2. 97270, 82438        Gross Description       1. Received in formalin labeled \"small intestine, duodenum\" is a 0.6 x 0.5 x 0.1 cm aggregate of pink tan pieces of tissue.  Submitted all in block 1A.    2. Received in formalin labeled \"gastric, antrum body biopsy\" is a 0.5 x 0.4 x 0.1 cm aggregate of pink tan pieces of tissue.  Submitted all in block 2A.  CC/USO/THM/pkm       Microscopic Description       Performed, incorporated in diagnosis.      Embedded Images             The following portions of the patient's history were reviewed and updated as appropriate: allergies, current medications, past family history, past medical history, past social history, past surgical history and problem list.    Review of Systems   Constitutional: Negative for activity change, chills, fatigue and fever.   Respiratory: Negative for cough and chest " tightness.    Cardiovascular: Negative for chest pain and palpitations.   Gastrointestinal: Negative for abdominal pain and nausea.   Endocrine: Negative for cold intolerance.   Psychiatric/Behavioral: Negative for behavioral problems and dysphoric mood.       Objective   Physical Exam   Constitutional: She appears well-developed and well-nourished.   Neck: Neck supple. No thyromegaly present.   Cardiovascular: Normal rate and regular rhythm.    No murmur heard.  Pulmonary/Chest: Effort normal and breath sounds normal.   Abdominal: Bowel sounds are normal. There is no tenderness.   Neurological: She is alert.   Psychiatric: She has a normal mood and affect. Her behavior is normal.   Nursing note and vitals reviewed.      Assessment/Plan   Deneen was seen today for depression.    Diagnoses and all orders for this visit:    Moderate episode of recurrent major depressive disorder (CMS/HCC)  Comments:  improved  Orders:  -     escitalopram (LEXAPRO) 20 MG tablet; Take 1 tablet by mouth Daily.    Other orders  -     Cancel: escitalopram (LEXAPRO) 10 MG tablet; Take 1 tablet by mouth Daily.

## 2018-11-01 ENCOUNTER — TELEPHONE (OUTPATIENT)
Dept: FAMILY MEDICINE CLINIC | Facility: CLINIC | Age: 28
End: 2018-11-01

## 2019-09-11 DIAGNOSIS — F33.1 MODERATE EPISODE OF RECURRENT MAJOR DEPRESSIVE DISORDER (HCC): ICD-10-CM

## 2019-09-11 RX ORDER — ESCITALOPRAM OXALATE 20 MG/1
TABLET ORAL
Qty: 90 TABLET | Refills: 0 | Status: SHIPPED | OUTPATIENT
Start: 2019-09-11 | End: 2020-02-24

## 2020-02-24 DIAGNOSIS — F33.1 MODERATE EPISODE OF RECURRENT MAJOR DEPRESSIVE DISORDER (HCC): ICD-10-CM

## 2020-02-24 RX ORDER — ESCITALOPRAM OXALATE 20 MG/1
TABLET ORAL
Qty: 30 TABLET | Refills: 0 | Status: SHIPPED | OUTPATIENT
Start: 2020-02-24 | End: 2022-03-14 | Stop reason: SDUPTHER

## 2020-03-19 DIAGNOSIS — F33.1 MODERATE EPISODE OF RECURRENT MAJOR DEPRESSIVE DISORDER (HCC): ICD-10-CM

## 2020-03-19 RX ORDER — ESCITALOPRAM OXALATE 20 MG/1
TABLET ORAL
Qty: 30 TABLET | Refills: 0 | OUTPATIENT
Start: 2020-03-19

## 2021-01-07 ENCOUNTER — OFFICE VISIT (OUTPATIENT)
Dept: FAMILY MEDICINE CLINIC | Facility: CLINIC | Age: 31
End: 2021-01-07

## 2021-01-07 VITALS
HEIGHT: 60 IN | SYSTOLIC BLOOD PRESSURE: 143 MMHG | WEIGHT: 169 LBS | TEMPERATURE: 97.8 F | DIASTOLIC BLOOD PRESSURE: 94 MMHG | BODY MASS INDEX: 33.18 KG/M2 | RESPIRATION RATE: 16 BRPM | HEART RATE: 78 BPM

## 2021-01-07 DIAGNOSIS — R59.0 LAD (LYMPHADENOPATHY) OF LEFT CERVICAL REGION: Primary | ICD-10-CM

## 2021-01-07 DIAGNOSIS — L29.9 ITCHING: ICD-10-CM

## 2021-01-07 PROBLEM — F32.A DEPRESSION: Status: ACTIVE | Noted: 2019-01-24

## 2021-01-07 PROBLEM — F45.22 BODY DYSMORPHIC DISORDER: Status: ACTIVE | Noted: 2020-09-15

## 2021-01-07 PROBLEM — F50.02 ANOREXIA NERVOSA WITH BULIMIA: Status: ACTIVE | Noted: 2020-09-15

## 2021-01-07 PROCEDURE — 99213 OFFICE O/P EST LOW 20 MIN: CPT | Performed by: FAMILY MEDICINE

## 2021-01-07 RX ORDER — DOXYCYCLINE HYCLATE 100 MG
100 TABLET ORAL 2 TIMES DAILY
Qty: 20 TABLET | Refills: 0 | Status: SHIPPED | OUTPATIENT
Start: 2021-01-07 | End: 2022-03-07

## 2021-01-07 NOTE — PROGRESS NOTES
"Subjective   Deneen Conti is a 30 y.o. female.     CC: Skin Itching/Bump    History of Present Illness     Pt comes in today c/o a roughly 3 week h/o left-sided neck itching/irritation feeling (no rash) and an enlargement of a nodule in the same area. Pt just moved back to town, has a 7 month old, and is working full time and is tired and stressed. The area is slightly better current but still itching (only on that left side) overlying the enlargement.      The following portions of the patient's history were reviewed and updated as appropriate: allergies, current medications, past family history, past medical history, past social history, past surgical history and problem list.    Review of Systems   Constitutional: Negative for activity change, chills and fever.   HENT:        Enlarged nodule left side of the neck   Respiratory: Negative for cough.    Cardiovascular: Negative for chest pain.   Skin: Negative for rash.        itching   Psychiatric/Behavioral: Negative for dysphoric mood.       /94   Pulse 78   Temp 97.8 °F (36.6 °C) (Oral)   Resp 16   Ht 152.4 cm (60\")   Wt 76.7 kg (169 lb)   BMI 33.01 kg/m²     Objective   Physical Exam  Constitutional:       General: She is not in acute distress.     Appearance: She is well-developed.   HENT:      Head:        Comments: Soft, mobile sub-q LAD noted (as above) with the above area reported as itching (no rash)  Pulmonary:      Effort: Pulmonary effort is normal.   Neurological:      Mental Status: She is alert and oriented to person, place, and time.   Psychiatric:         Behavior: Behavior normal.         Thought Content: Thought content normal.         Assessment/Plan   Diagnoses and all orders for this visit:    1. LAD (lymphadenopathy) of left cervical region (Primary)  -     doxycycline (VIBRAMYICN) 100 MG tablet; Take 1 tablet by mouth 2 (Two) Times a Day.  Dispense: 20 tablet; Refill: 0    2. Itching  -     hydrocortisone 2.5 % cream; Apply  " topically to the appropriate area as directed 2 (Two) Times a Day.  Dispense: 20 g; Refill: 1    Backup contraception discussed. Feel that pt had a mild case of shingles w/o a rash, thus the perception of itching in a dermatomal region along with the LAD. Will treat but pt to contact if the LAD doesn't resolve at the end of the abx and will get U/S and ENT referral at that time.

## 2022-03-14 ENCOUNTER — OFFICE VISIT (OUTPATIENT)
Dept: FAMILY MEDICINE CLINIC | Facility: CLINIC | Age: 32
End: 2022-03-14

## 2022-03-14 VITALS
DIASTOLIC BLOOD PRESSURE: 86 MMHG | BODY MASS INDEX: 30.82 KG/M2 | SYSTOLIC BLOOD PRESSURE: 125 MMHG | HEIGHT: 60 IN | WEIGHT: 157 LBS | HEART RATE: 78 BPM | TEMPERATURE: 97.1 F | OXYGEN SATURATION: 98 %

## 2022-03-14 DIAGNOSIS — F41.1 GENERALIZED ANXIETY DISORDER: ICD-10-CM

## 2022-03-14 DIAGNOSIS — F33.1 MODERATE EPISODE OF RECURRENT MAJOR DEPRESSIVE DISORDER: ICD-10-CM

## 2022-03-14 PROCEDURE — 99214 OFFICE O/P EST MOD 30 MIN: CPT | Performed by: NURSE PRACTITIONER

## 2022-03-14 RX ORDER — ESCITALOPRAM OXALATE 20 MG/1
20 TABLET ORAL DAILY
Qty: 90 TABLET | Refills: 1 | Status: SHIPPED | OUTPATIENT
Start: 2022-03-14 | End: 2022-03-14 | Stop reason: SDUPTHER

## 2022-03-14 RX ORDER — ESCITALOPRAM OXALATE 20 MG/1
20 TABLET ORAL DAILY
Qty: 90 TABLET | Refills: 1 | Status: SHIPPED | OUTPATIENT
Start: 2022-03-14 | End: 2022-09-10

## 2022-03-14 NOTE — PROGRESS NOTES
"Chief Complaint  Depression    Subjective          Deneen presents to [facility]    Deneen Conti female 31 y.o., /86   Pulse 78   Temp 97.1 °F (36.2 °C)   Ht 152.4 cm (60\")   Wt 71.2 kg (157 lb)   SpO2 98%   BMI 30.66 kg/m²   who presents today for Follow up on  Depression.  She reports depressed mood and anxiety and excessive worry. Onset of symptoms was approximately several years ago.  She denies current suicidal and homicidal ideation. Risk factors are lifestyle of multiple roles, family situation/stress and prior diagnosis of depression.  Previous treatment includes medication (SSRI). She complains of the following medication side effects: none. The patient has previously been in counseling..  She does report some increased anxiety over the past month, but did have a small period that she did not have her medication.  Will follow up if this continues.        PHQ-2 Depression Screening  Little interest or pleasure in doing things?  0   Feeling down, depressed, or hopeless?  0   PHQ-2 Total Score  0       B/p in office today is 125/86.  Denies any HA, blurred vision, flushing, chest pain or pressure.    She is not fasting today, but is due for labs.  Will return      She has no other c/o today    The following portions of the patient's history were reviewed and updated as appropriate: allergies, current medications, past family history, past medical history, past social history, past surgical history, and problem list        Review of Systems   Constitutional: Negative for chills, fatigue and fever.   Respiratory: Negative for cough and shortness of breath.    Cardiovascular: Negative for chest pain, palpitations and leg swelling.   Gastrointestinal: Negative for abdominal pain.   Neurological: Negative for dizziness and light-headedness.   Psychiatric/Behavioral: Negative for dysphoric mood, self-injury, sleep disturbance and suicidal ideas. The patient is " "nervous/anxious.         Objective   Vital Signs:   Vitals:    03/14/22 1147   BP: 125/86   Pulse: 78   Temp: 97.1 °F (36.2 °C)   SpO2: 98%   Weight: 71.2 kg (157 lb)   Height: 152.4 cm (60\")        Physical Exam  Constitutional:       General: She is not in acute distress.     Appearance: Normal appearance. She is normal weight. She is not ill-appearing.   HENT:      Head: Normocephalic.   Cardiovascular:      Rate and Rhythm: Normal rate and regular rhythm.      Pulses: Normal pulses.      Heart sounds: Normal heart sounds. No murmur heard.  Pulmonary:      Effort: Pulmonary effort is normal.      Breath sounds: Normal breath sounds.   Musculoskeletal:      Cervical back: Neck supple.   Skin:     General: Skin is warm.      Findings: No rash.   Neurological:      Mental Status: She is alert and oriented to person, place, and time.   Psychiatric:         Mood and Affect: Mood normal.         Behavior: Behavior normal.         Thought Content: Thought content normal.         Judgment: Judgment normal.          Result Review :     The following data was reviewed by: YOUSUF Banks on 03/14/2022:           Assessment and Plan    Diagnoses and all orders for this visit:    1. Moderate episode of recurrent major depressive disorder (HCC)  Comments:  controlled  Lexapro 20 mg daily  No SE  Continue RX  Denies SI/Hi  support plan in place  Orders:  -     Discontinue: escitalopram (LEXAPRO) 20 MG tablet; Take 1 tablet by mouth Daily for 180 days.  Dispense: 90 tablet; Refill: 1  -     escitalopram (LEXAPRO) 20 MG tablet; Take 1 tablet by mouth Daily for 180 days.  Dispense: 90 tablet; Refill: 1  -     Comprehensive Metabolic Panel  -     CBC & Differential  -     Lipid Panel  -     TSH  -     T4, Free    2. Generalized anxiety disorder  Comments:  increased-  missed doses of Lexapro  will follow up if not controlled  Orders:  -     escitalopram (LEXAPRO) 20 MG tablet; Take 1 tablet by mouth Daily for 180 days.  " Dispense: 90 tablet; Refill: 1  -     Comprehensive Metabolic Panel  -     CBC & Differential  -     Lipid Panel  -     TSH  -     T4, Free        Follow Up   Return in about 6 months (around 9/14/2022) for Annual physical.  Patient was given instructions and counseling regarding her condition or for health maintenance advice. Please see specific information pulled into the AVS if appropriate.

## 2023-11-15 ENCOUNTER — TELEPHONE (OUTPATIENT)
Dept: FAMILY MEDICINE CLINIC | Facility: CLINIC | Age: 33
End: 2023-11-15

## 2023-11-15 NOTE — TELEPHONE ENCOUNTER
Caller: Deneen Conti    Relationship to patient: Self    Best call back number: [phone]    Patient is needing: SHE IS CALLING TO GET A COPY OF THE LETTER FROM 11/9/18 REGARDING THE EMOTIONAL SUPPORT ANIMAL.  SHE LOOKED IN MY CHART UNDER LETTERS AND IT SAYS NO LETTERS FOUND.  COULD IT BE BECAUSE IT WAS FROM 5 YEARS AGO?  CAN YOU EMAIL IT TO HER AT [email or web address].  IF NOT, PLEASE GIVE HER A CALL.

## 2023-11-17 ENCOUNTER — TELEPHONE (OUTPATIENT)
Dept: FAMILY MEDICINE CLINIC | Facility: CLINIC | Age: 33
End: 2023-11-17

## 2023-11-17 NOTE — TELEPHONE ENCOUNTER
Caller: Howard Conti    Relationship: Emergency Contact    Best call back number: [phone]       Who are you requesting to speak with (clinical staff, provider,  specific staff member):     What was the call regarding: PATIENTS  STATES THERE IS A LETTER FOR AN EMOTIONAL SUPPORT ANIMAL FOR PATIENT TO COME , BUT THE PATIENT WILL NOT BE OFF OF WORK IN TIME AND HE IS WANTING TO KNOW IF IT WILL BE OK FOR HIM TO COME .

## (undated) DEVICE — BITEBLOCK OMNI BLOC

## (undated) DEVICE — Device: Brand: DEFENDO AIR/WATER/SUCTION AND BIOPSY VALVE

## (undated) DEVICE — TUBING, SUCTION, 1/4" X 10', STRAIGHT: Brand: MEDLINE

## (undated) DEVICE — CANN NASL CO2 TRULINK W/O2 A/

## (undated) DEVICE — FRCP BX RADJAW4 NDL 2.8 240CM LG OG BX40